# Patient Record
Sex: FEMALE | Race: BLACK OR AFRICAN AMERICAN | NOT HISPANIC OR LATINO | Employment: OTHER | URBAN - METROPOLITAN AREA
[De-identification: names, ages, dates, MRNs, and addresses within clinical notes are randomized per-mention and may not be internally consistent; named-entity substitution may affect disease eponyms.]

---

## 2021-08-17 ENCOUNTER — CLINICAL SUPPORT (OUTPATIENT)
Dept: AUDIOLOGY | Facility: CLINIC | Age: 61
End: 2021-08-17
Payer: OTHER GOVERNMENT

## 2021-08-17 ENCOUNTER — OFFICE VISIT (OUTPATIENT)
Dept: OTOLARYNGOLOGY | Facility: CLINIC | Age: 61
End: 2021-08-17
Payer: OTHER GOVERNMENT

## 2021-08-17 VITALS
HEIGHT: 62 IN | BODY MASS INDEX: 31.28 KG/M2 | WEIGHT: 170 LBS | DIASTOLIC BLOOD PRESSURE: 85 MMHG | HEART RATE: 70 BPM | SYSTOLIC BLOOD PRESSURE: 134 MMHG

## 2021-08-17 DIAGNOSIS — H90.3 SENSORINEURAL HEARING LOSS (SNHL) OF BOTH EARS: Primary | ICD-10-CM

## 2021-08-17 DIAGNOSIS — H93.13 SUBJECTIVE TINNITUS OF BOTH EARS: ICD-10-CM

## 2021-08-17 DIAGNOSIS — H90.5 HEARING LOSS, SENSORINEURAL, COMBINED TYPES: Primary | ICD-10-CM

## 2021-08-17 PROCEDURE — 99203 OFFICE O/P NEW LOW 30 MIN: CPT | Mod: S$PBB,,, | Performed by: ORTHOPAEDIC SURGERY

## 2021-08-17 PROCEDURE — 92557 COMPREHENSIVE HEARING TEST: CPT | Mod: PBBFAC | Performed by: AUDIOLOGIST

## 2021-08-17 PROCEDURE — 99999 PR PBB SHADOW E&M-EST. PATIENT-LVL III: ICD-10-PCS | Mod: PBBFAC,,, | Performed by: ORTHOPAEDIC SURGERY

## 2021-08-17 PROCEDURE — 99203 PR OFFICE/OUTPT VISIT, NEW, LEVL III, 30-44 MIN: ICD-10-PCS | Mod: S$PBB,,, | Performed by: ORTHOPAEDIC SURGERY

## 2021-08-17 PROCEDURE — 99213 OFFICE O/P EST LOW 20 MIN: CPT | Mod: PBBFAC | Performed by: ORTHOPAEDIC SURGERY

## 2021-08-17 PROCEDURE — 99999 PR PBB SHADOW E&M-EST. PATIENT-LVL III: CPT | Mod: PBBFAC,,, | Performed by: ORTHOPAEDIC SURGERY

## 2021-08-17 PROCEDURE — 92567 TYMPANOMETRY: CPT | Mod: PBBFAC | Performed by: AUDIOLOGIST

## 2021-08-17 RX ORDER — OMEPRAZOLE 40 MG/1
40 CAPSULE, DELAYED RELEASE ORAL
COMMUNITY
Start: 2021-01-09

## 2021-08-17 RX ORDER — ATORVASTATIN CALCIUM 20 MG/1
20 TABLET, FILM COATED ORAL NIGHTLY
COMMUNITY
Start: 2021-06-03

## 2021-08-17 RX ORDER — AMLODIPINE BESYLATE 10 MG/1
10 TABLET ORAL
COMMUNITY
Start: 2021-02-14 | End: 2022-12-05

## 2022-10-13 ENCOUNTER — TELEPHONE (OUTPATIENT)
Dept: DIABETES | Facility: CLINIC | Age: 62
End: 2022-10-13
Payer: OTHER GOVERNMENT

## 2022-10-13 NOTE — TELEPHONE ENCOUNTER
----- Message from Samy Calderon PA-C sent at 10/13/2022  1:53 PM CDT -----  This pt is somewhat new to [facility] - only seen ent. Can you find out what type of diabetes she has and see if we can get a copy of her records? Last pcp note and most recent A1c

## 2022-10-19 ENCOUNTER — TELEPHONE (OUTPATIENT)
Dept: DIABETES | Facility: CLINIC | Age: 62
End: 2022-10-19
Payer: OTHER GOVERNMENT

## 2022-10-19 NOTE — TELEPHONE ENCOUNTER
----- Message from Mary Harris sent at 10/19/2022  2:53 PM CDT -----  Contact: self  Type:  Sooner Apoointment Request    Caller is requesting a sooner appointment.  Caller declined first available appointment listed below.  Caller will not accept being placed on the waitlist and is requesting a message be sent to doctor.  Name of Caller: patient  When is the first available appointment? 11/30/22  Symptoms: feeling really bad  Would the patient rather a call back or a response via [facility]?  Call back  Best Call Back Number: [phone]  Additional Information:

## 2022-11-08 ENCOUNTER — TELEPHONE (OUTPATIENT)
Dept: DIABETES | Facility: CLINIC | Age: 62
End: 2022-11-08
Payer: OTHER GOVERNMENT

## 2022-11-21 ENCOUNTER — TELEPHONE (OUTPATIENT)
Dept: DIABETES | Facility: CLINIC | Age: 62
End: 2022-11-21
Payer: OTHER GOVERNMENT

## 2022-11-21 NOTE — TELEPHONE ENCOUNTER
----- Message from Hilda Zavala sent at 11/21/2022 10:19 AM CST -----  Pt would like a call back regarding a sooner appt. Call back number is [phone]. Thx JM

## 2022-11-28 ENCOUNTER — TELEPHONE (OUTPATIENT)
Dept: DIABETES | Facility: CLINIC | Age: 62
End: 2022-11-28
Payer: OTHER GOVERNMENT

## 2022-11-28 NOTE — TELEPHONE ENCOUNTER
Spoke w/ pt to request chart notes. Pt is going to contact her PCP office. Also explained to pt that we are trying to change her appt date and will contact her if able to do so.     ----- Message from Samy Calderon PA-C sent at 11/28/2022  8:07 AM CST -----  This patient needs to be switched to a Monday if she is going to come in. If we can find someone to switch with her, can she come on 12/5 instead? Respond here and let me know.     Also can she call her pcp to get them to fax us a chart note so I have some medical history for her     Thank you

## 2022-12-02 ENCOUNTER — TELEPHONE (OUTPATIENT)
Dept: DIABETES | Facility: CLINIC | Age: 62
End: 2022-12-02
Payer: OTHER GOVERNMENT

## 2022-12-02 NOTE — TELEPHONE ENCOUNTER
----- Message from Samy Calderon PA-C sent at 12/2/2022  7:51 AM CST -----  Please call pt and let her know we have not received last chart notes from pcp. Need before visit on Monday. If unable to get faxed with us can bring to her visit    Thank you

## 2022-12-02 NOTE — TELEPHONE ENCOUNTER
called pt to let her know we have not received last chart notes from pcp. Need before visit on Monday. If unable to get faxed with us can bring to her visit

## 2022-12-05 ENCOUNTER — TELEPHONE (OUTPATIENT)
Dept: OTOLARYNGOLOGY | Facility: CLINIC | Age: 62
End: 2022-12-05
Payer: OTHER GOVERNMENT

## 2022-12-05 ENCOUNTER — OFFICE VISIT (OUTPATIENT)
Dept: DIABETES | Facility: CLINIC | Age: 62
End: 2022-12-05
Payer: OTHER GOVERNMENT

## 2022-12-05 VITALS
BODY MASS INDEX: 28.2 KG/M2 | HEIGHT: 62 IN | HEART RATE: 64 BPM | WEIGHT: 153.25 LBS | DIASTOLIC BLOOD PRESSURE: 80 MMHG | SYSTOLIC BLOOD PRESSURE: 136 MMHG

## 2022-12-05 DIAGNOSIS — E78.00 PURE HYPERCHOLESTEROLEMIA: ICD-10-CM

## 2022-12-05 DIAGNOSIS — E11.65 TYPE 2 DIABETES MELLITUS WITH HYPERGLYCEMIA, WITHOUT LONG-TERM CURRENT USE OF INSULIN: Primary | ICD-10-CM

## 2022-12-05 DIAGNOSIS — R06.2 WHEEZING: ICD-10-CM

## 2022-12-05 DIAGNOSIS — I10 ESSENTIAL HYPERTENSION: ICD-10-CM

## 2022-12-05 DIAGNOSIS — J30.2 SEASONAL ALLERGIES: ICD-10-CM

## 2022-12-05 PROBLEM — F33.9 CHRONIC RECURRENT MAJOR DEPRESSIVE DISORDER: Status: ACTIVE | Noted: 2022-12-05

## 2022-12-05 PROBLEM — R94.31 ABNORMAL EKG: Status: ACTIVE | Noted: 2019-04-02

## 2022-12-05 PROBLEM — R07.9 CHEST PAIN: Status: ACTIVE | Noted: 2022-12-05

## 2022-12-05 PROBLEM — M19.90 OSTEOARTHRITIS: Status: ACTIVE | Noted: 2022-12-05

## 2022-12-05 PROBLEM — D17.21 LIPOMA OF RIGHT UPPER EXTREMITY: Status: ACTIVE | Noted: 2021-03-24

## 2022-12-05 PROBLEM — K21.9 GASTROESOPHAGEAL REFLUX DISEASE WITHOUT ESOPHAGITIS: Status: ACTIVE | Noted: 2022-12-05

## 2022-12-05 PROBLEM — E78.5 HYPERLIPIDEMIA: Status: ACTIVE | Noted: 2022-12-05

## 2022-12-05 LAB — GLUCOSE SERPL-MCNC: 156 MG/DL (ref 70–110)

## 2022-12-05 PROCEDURE — 99999 PR PBB SHADOW E&M-EST. PATIENT-LVL IV: ICD-10-PCS | Mod: PBBFAC,,, | Performed by: PHYSICIAN ASSISTANT

## 2022-12-05 PROCEDURE — 99214 OFFICE O/P EST MOD 30 MIN: CPT | Mod: PBBFAC | Performed by: PHYSICIAN ASSISTANT

## 2022-12-05 PROCEDURE — 99205 OFFICE O/P NEW HI 60 MIN: CPT | Mod: S$PBB,,, | Performed by: PHYSICIAN ASSISTANT

## 2022-12-05 PROCEDURE — 82962 GLUCOSE BLOOD TEST: CPT | Mod: PBBFAC | Performed by: PHYSICIAN ASSISTANT

## 2022-12-05 PROCEDURE — 99205 PR OFFICE/OUTPT VISIT, NEW, LEVL V, 60-74 MIN: ICD-10-PCS | Mod: S$PBB,,, | Performed by: PHYSICIAN ASSISTANT

## 2022-12-05 PROCEDURE — 99999 PR PBB SHADOW E&M-EST. PATIENT-LVL IV: CPT | Mod: PBBFAC,,, | Performed by: PHYSICIAN ASSISTANT

## 2022-12-05 RX ORDER — GABAPENTIN 300 MG/1
300 CAPSULE ORAL NIGHTLY
COMMUNITY
Start: 2022-10-12

## 2022-12-05 RX ORDER — MECLIZINE HYDROCHLORIDE 25 MG/1
25 TABLET ORAL 3 TIMES DAILY PRN
COMMUNITY
Start: 2022-08-15

## 2022-12-05 RX ORDER — METFORMIN HYDROCHLORIDE 500 MG/1
1 TABLET ORAL
COMMUNITY
Start: 2022-07-16 | End: 2022-12-05

## 2022-12-05 RX ORDER — TRAZODONE HYDROCHLORIDE 50 MG/1
50 TABLET ORAL NIGHTLY
COMMUNITY
Start: 2022-07-07

## 2022-12-05 RX ORDER — FUROSEMIDE 20 MG/1
20 TABLET ORAL DAILY PRN
COMMUNITY
Start: 2022-09-08 | End: 2022-12-05

## 2022-12-05 RX ORDER — INSULIN PUMP SYRINGE, 3 ML
EACH MISCELLANEOUS
Qty: 1 EACH | Refills: 0 | Status: SHIPPED | OUTPATIENT
Start: 2022-12-05

## 2022-12-05 RX ORDER — LANCETS
1 EACH MISCELLANEOUS 3 TIMES DAILY
Qty: 100 EACH | Refills: 11 | Status: SHIPPED | OUTPATIENT
Start: 2022-12-05

## 2022-12-05 RX ORDER — LEVOCETIRIZINE DIHYDROCHLORIDE 5 MG/1
5 TABLET, FILM COATED ORAL
COMMUNITY
Start: 2022-11-12 | End: 2022-12-05

## 2022-12-05 RX ORDER — FLUTICASONE PROPIONATE 50 MCG
SPRAY, SUSPENSION (ML) NASAL
COMMUNITY
Start: 2022-09-12

## 2022-12-05 RX ORDER — FLASH GLUCOSE SENSOR
1 KIT MISCELLANEOUS
Qty: 2 KIT | Refills: 11 | Status: SHIPPED | OUTPATIENT
Start: 2022-12-05

## 2022-12-05 RX ORDER — ALBUTEROL SULFATE 90 UG/1
2 AEROSOL, METERED RESPIRATORY (INHALATION)
Qty: 18 G | Refills: 0
Start: 2022-12-05 | End: 2022-12-05

## 2022-12-05 RX ORDER — AMLODIPINE BESYLATE 5 MG/1
5 TABLET ORAL DAILY
COMMUNITY
Start: 2022-10-12

## 2022-12-05 RX ORDER — LANCETS
1 EACH MISCELLANEOUS 3 TIMES DAILY
Qty: 100 EACH | Refills: 11 | Status: SHIPPED | OUTPATIENT
Start: 2022-12-05 | End: 2022-12-05

## 2022-12-05 RX ORDER — IBUPROFEN 800 MG/1
800 TABLET ORAL DAILY PRN
COMMUNITY

## 2022-12-05 RX ORDER — TRIAMCINOLONE ACETONIDE 1 MG/G
CREAM TOPICAL
COMMUNITY
Start: 2022-09-07

## 2022-12-05 RX ORDER — AZELASTINE 1 MG/ML
SPRAY, METERED NASAL
COMMUNITY

## 2022-12-05 RX ORDER — MONTELUKAST SODIUM 10 MG/1
10 TABLET ORAL DAILY
COMMUNITY
Start: 2022-11-20

## 2022-12-05 RX ORDER — AMOXICILLIN AND CLAVULANATE POTASSIUM 875; 125 MG/1; MG/1
TABLET, FILM COATED ORAL
COMMUNITY
End: 2022-12-05

## 2022-12-05 RX ORDER — FAMOTIDINE 40 MG/1
40 TABLET, FILM COATED ORAL DAILY
COMMUNITY
Start: 2022-11-07 | End: 2022-12-05

## 2022-12-05 RX ORDER — CYCLOBENZAPRINE HCL 10 MG
10 TABLET ORAL 3 TIMES DAILY PRN
COMMUNITY
Start: 2022-07-07

## 2022-12-05 RX ORDER — FLASH GLUCOSE SENSOR
1 KIT MISCELLANEOUS
Qty: 2 KIT | Refills: 11 | Status: SHIPPED | OUTPATIENT
Start: 2022-12-05 | End: 2022-12-05

## 2022-12-05 RX ORDER — INSULIN PUMP SYRINGE, 3 ML
EACH MISCELLANEOUS
Qty: 1 EACH | Refills: 0 | Status: SHIPPED | OUTPATIENT
Start: 2022-12-05 | End: 2022-12-05

## 2022-12-05 RX ORDER — CETIRIZINE HYDROCHLORIDE, PSEUDOEPHEDRINE HYDROCHLORIDE 5; 120 MG/1; MG/1
1 TABLET, FILM COATED, EXTENDED RELEASE ORAL
Refills: 0
Start: 2022-12-05 | End: 2022-12-15

## 2022-12-05 NOTE — Clinical Note
Follow up in 3 mo in person   Dr. Buckley staff - needs appt with Kristin and Dr. Buckley for hearing loss / transient vertigo. Transferring all care to [facility].

## 2022-12-05 NOTE — PROGRESS NOTES
PCP: Primary Doctor No    Subjective:     Chief Complaint: Diabetes Consult    HISTORY OF PRESENT ILLNESS: 62 y.o.   female presenting for diabetes consult.   Patient has had Type II diabetes since 2021.  Pertinent to decision making is the following comorbidities: HTN and HLD. Fam hx of pancreatic cancer - mother.   Patient has the following Diabetes complications: without complications  She  is to be enrolled in diabetes education classes.     Patient's most recent A1c of 6.1% was completed 3 months ago.   Patient states since Her last A1c Her blood glucose levels have been unknown since not checking regularly.   Patient monitors blood glucose  0  times per day :  Patient admits not checking and does not meter . Patient admits she does not like checking fingers.    Patient blood glucose monitoring device will not be uploaded into [employer] [facility] today.  Patient's blood glucose in clinic was 156 which was following breakfast bar.   Patient endorses the following diabetes related symptoms: None.  Patient is due today for the following diabetes-related health maintenance standards: Eye Exam, Lipid panel, A1c, Influenza Vaccine, COVID-19 Vaccine , and Mammogram .   She denies recent hospital admissions or emergency room visits.   She denies having hypoglycemia.   Patient's concerns today include glycemic control. Of note, patient reports that before being diagnosed last year with diabetes she was eating more sweets and not exercising following her senior care. Since being diagnosed, patient has been working to improve diet and exercise daily. Patient reports alternating running / walking for 2 hours daily (approx 7 miles) and takes exercise classes 2 times per week where she does floor exercises and light weights. Patient reports has cut most sweets and soft drinks out of her diet and would like more info on diet. Of note, patient would like to transfer PCP and specialty care to [facility]. Needs allergist  for chronic seasonal allergies. Need to see ENT for follow up for hearing loss and episodes of vertigo.   Patient medication regimen is as below.     CURRENT DM MEDICATIONS:   Metformin - didn't take  Diet and exercise    Patient has failed the following Diabetes medications:   N/A       Labs Reviewed.       No results found for: CPEPTIDE  No results found for: GLUTAMICACID       //   , There is no height or weight on file to calculate BMI.  Her blood sugar in clinic today is:    No components found for: POCGLUC      Review of Systems   Constitutional:  Negative for activity change, appetite change, chills and fever.   HENT:  Negative for dental problem, mouth sores, nosebleeds, sore throat and trouble swallowing.    Eyes:  Negative for pain and discharge.   Respiratory:  Negative for shortness of breath, wheezing and stridor.    Cardiovascular:  Negative for chest pain, palpitations and leg swelling.   Gastrointestinal:  Negative for abdominal pain, diarrhea, nausea and vomiting.   Endocrine: Negative for polydipsia, polyphagia and polyuria.   Genitourinary:  Negative for dysuria, frequency and urgency.   Musculoskeletal:  Negative for joint swelling and myalgias.   Skin:  Negative for rash and wound.   Neurological:  Negative for dizziness, syncope, weakness and headaches.   Psychiatric/Behavioral:  Negative for behavioral problems and dysphoric mood.        Diabetes Management Status  Statin: Taking  ACE/ARB: Not taking    Screening or Prevention Patient's value Goal Complete/Controlled?   HgA1C Testing and Control   No results found for: HGBA1C   Annually/Less than 8% No     Lipid profile Most Recent Lipid Panel Health Maintenance Topic Completion: Not Found Annually No     LDL control No results found for: LDLCALC Annually/Less than 100 mg/dl  No     Nephropathy screening No results found for: MICALBCREAT  No results found for: PROTEINUA Annually No     Blood pressure BP Readings from Last 1 Encounters:    08/17/21 134/85    Less than 140/90 Yes     Dilated retinal exam Most Recent Eye Exam Date: Not Found Annually Yes    Foot exam   Most Recent Foot Exam Date: Not Found Annually Yes       Social History     Socioeconomic History    Marital status:    Tobacco Use    Smoking status: Never    Smokeless tobacco: Never   Substance and Sexual Activity    Alcohol use: Not Currently    Drug use: Not Currently     Past Medical History:   Diagnosis Date    Hypertension        Objective:      Physical Exam  Constitutional:       General: She is not in acute distress.     Appearance: She is well-developed. She is not diaphoretic.   HENT:      Head: Normocephalic and atraumatic.      Right Ear: External ear normal.      Left Ear: External ear normal.      Nose: Nose normal.   Eyes:      General:         Right eye: No discharge.         Left eye: No discharge.      Pupils: Pupils are equal, round, and reactive to light.   Cardiovascular:      Rate and Rhythm: Normal rate and regular rhythm.      Heart sounds: Normal heart sounds.   Pulmonary:      Effort: Pulmonary effort is normal.      Breath sounds: Normal breath sounds.   Abdominal:      Palpations: Abdomen is soft.   Musculoskeletal:         General: Normal range of motion.      Cervical back: Normal range of motion and neck supple.   Skin:     General: Skin is warm and dry.      Capillary Refill: Capillary refill takes less than 2 seconds.   Neurological:      Mental Status: She is alert and oriented to person, place, and time.      Motor: No abnormal muscle tone.      Coordination: Coordination normal.   Psychiatric:         Behavior: Behavior normal.         Thought Content: Thought content normal.         Judgment: Judgment normal.         Assessment / Plan:     Type 2 diabetes mellitus with hyperglycemia, without long-term current use of insulin  -     POCT Glucose, Hand-Held Device  -     Ambulatory referral/consult to Internal Medicine; Future; Expected date:  12/12/2022  -     Hemoglobin A1C; Standing  -     Lipid Panel; Future; Expected date: 12/05/2022  -     Discontinue: flash glucose sensor (FREESTYLE DIANA 14 DAY SENSOR) Kit; 1 each by Misc.(Non-Drug; Combo Route) route every 14 (fourteen) days.  Dispense: 2 kit; Refill: 11  -     Discontinue: blood-glucose meter kit; Test finger stick blood sugar once daily.  Dispense: 1 each; Refill: 0  -     Discontinue: blood sugar diagnostic Strp; 1 each by Misc.(Non-Drug; Combo Route) route 3 (three) times daily.  Dispense: 100 each; Refill: 11  -     Discontinue: lancets (ONETOUCH ULTRASOFT LANCETS) Misc; 1 each by Misc.(Non-Drug; Combo Route) route 3 (three) times daily.  Dispense: 100 each; Refill: 11  -     blood sugar diagnostic Strp; 1 each by Misc.(Non-Drug; Combo Route) route 3 (three) times daily.  Dispense: 100 each; Refill: 11  -     blood-glucose meter kit; Test finger stick blood sugar once daily.  Dispense: 1 each; Refill: 0  -     flash glucose sensor (FREESTYLE DIANA 14 DAY SENSOR) Kit; 1 each by Misc.(Non-Drug; Combo Route) route every 14 (fourteen) days.  Dispense: 2 kit; Refill: 11  -     lancets (ONETOUCH ULTRASOFT LANCETS) Misc; 1 each by Misc.(Non-Drug; Combo Route) route 3 (three) times daily.  Dispense: 100 each; Refill: 11  -     Ambulatory referral/consult to Diabetes Education; Future; Expected date: 12/12/2022    Wheezing  -     Discontinue: albuterol (VENTOLIN HFA) 90 mcg/actuation inhaler; Inhale 2 puffs into the lungs every 4 to 6 hours as needed for Wheezing. Rescue (Patient not taking: Reported on 12/5/2022)  Dispense: 18 g; Refill: 0  -     Ambulatory referral/consult to Allergy; Future; Expected date: 12/12/2022    Essential hypertension  -     Ambulatory referral/consult to Internal Medicine; Future; Expected date: 12/12/2022    Pure hypercholesterolemia  -     Ambulatory referral/consult to Internal Medicine; Future; Expected date: 12/12/2022    Seasonal allergies  -     Ambulatory  referral/consult to Allergy; Future; Expected date: 12/12/2022  -     cetirizine-pseudoephedrine 5-120 mg Tb12; Take 1 tablet by mouth as needed.; Refill: 0    Additional Plan Details:    - POCT Glucose  - Encouraged continuation of lifestyle changes including regular exercise and limiting carbohydrates to 30-45 grams per meal threes times daily and 15 grams per snack with a limit of two daily.   - Encouraged continued monitoring of blood glucose with an increase to 1 times 3 times per week at Fasting. Insurance preferred Meter and supplies Rx today. Will call back for meter training and Latasha training. Latasha Rx OTC.   - Current DM Medication Regimen: Continue diet and exercise.   - Health Maintenance standards addressed today: Eye Exam - will be completed outside of [facility] and patient will schedule, Lipid panel to be scheduled today, A1c to be scheduled, Flu Shot - Patient completed outside of [facility]; will coordinate records retrieval , and Mammogram - [facility]  - Referral to CDE for establishment of care for comprehensive Diabetes Education  - Added medications taking outside of [facility]  - Referral to internal medicine - est care with PCP   - Referral to Allergy - chronic seasonal allergies  - Follow up with ENT for hearing loss and vertigo episodes   - Nursing Visit: Patient is below goal range for nursing visit for age group and will not need nursing visit at this time .   - Follow up with me in 3 months with A1c prior.       Blakeney McKnight, PA-C [facility]    A total of 60 minutes was spent in face to face time, of which over 50 % was spent in counseling patient on disease process, complications, treatment, and side effects of medications.

## 2022-12-23 ENCOUNTER — TELEPHONE (OUTPATIENT)
Dept: DIABETES | Facility: CLINIC | Age: 62
End: 2022-12-23
Payer: OTHER GOVERNMENT

## 2022-12-23 NOTE — TELEPHONE ENCOUNTER
Attempted to R/S 1/10 appt with Radha since no longer seeing pts at [facility]. No answer and VM not set up.

## 2023-01-03 ENCOUNTER — LAB VISIT (OUTPATIENT)
Dept: LAB | Facility: HOSPITAL | Age: 63
End: 2023-01-03
Attending: PHYSICIAN ASSISTANT
Payer: MEDICARE

## 2023-01-03 DIAGNOSIS — E11.65 TYPE 2 DIABETES MELLITUS WITH HYPERGLYCEMIA, WITHOUT LONG-TERM CURRENT USE OF INSULIN: ICD-10-CM

## 2023-01-03 PROCEDURE — 36415 COLL VENOUS BLD VENIPUNCTURE: CPT | Mod: PO | Performed by: PHYSICIAN ASSISTANT

## 2023-01-03 PROCEDURE — 80061 LIPID PANEL: CPT | Performed by: PHYSICIAN ASSISTANT

## 2023-01-03 PROCEDURE — 83036 HEMOGLOBIN GLYCOSYLATED A1C: CPT | Performed by: PHYSICIAN ASSISTANT

## 2023-01-04 LAB
CHOLEST SERPL-MCNC: 200 MG/DL (ref 120–199)
CHOLEST/HDLC SERPL: 4 {RATIO} (ref 2–5)
ESTIMATED AVG GLUCOSE: 128 MG/DL (ref 68–131)
HBA1C MFR BLD: 6.1 % (ref 4–5.6)
HDLC SERPL-MCNC: 50 MG/DL (ref 40–75)
HDLC SERPL: 25 % (ref 20–50)
LDLC SERPL CALC-MCNC: 130.8 MG/DL (ref 63–159)
NONHDLC SERPL-MCNC: 150 MG/DL
TRIGL SERPL-MCNC: 96 MG/DL (ref 30–150)

## 2023-01-10 ENCOUNTER — TELEPHONE (OUTPATIENT)
Dept: DIABETES | Facility: CLINIC | Age: 63
End: 2023-01-10
Payer: MEDICARE

## 2023-01-10 NOTE — TELEPHONE ENCOUNTER
Per pt request, r/s to Friday, Jan 20 at 9 am.    ----- Message from Nancy Haro RD, CDE sent at 1/10/2023  3:02 PM CST -----  Contact: self/[phone]    ----- Message -----  From: Marely Brian  Sent: 1/10/2023   1:22 PM CST  To: , #    Pt is calling in regards to appointment on tomorrow Wednesday 1/11/23, she states she would like to reschedule. Please give her a call back at [phone]. Thank you s/g

## 2023-01-20 ENCOUNTER — CLINICAL SUPPORT (OUTPATIENT)
Dept: DIABETES | Facility: CLINIC | Age: 63
End: 2023-01-20
Payer: MEDICARE

## 2023-01-20 VITALS — BODY MASS INDEX: 27.62 KG/M2 | WEIGHT: 151 LBS

## 2023-01-20 DIAGNOSIS — E11.65 TYPE 2 DIABETES MELLITUS WITH HYPERGLYCEMIA, WITHOUT LONG-TERM CURRENT USE OF INSULIN: ICD-10-CM

## 2023-01-20 PROCEDURE — 99213 OFFICE O/P EST LOW 20 MIN: CPT | Mod: PBBFAC

## 2023-01-20 PROCEDURE — 99999 PR PBB SHADOW E&M-EST. PATIENT-LVL III: CPT | Mod: PBBFAC,,,

## 2023-01-20 PROCEDURE — G0108 DIAB MANAGE TRN  PER INDIV: HCPCS | Mod: PBBFAC

## 2023-01-20 PROCEDURE — 99999 PR PBB SHADOW E&M-EST. PATIENT-LVL III: ICD-10-PCS | Mod: PBBFAC,,,

## 2023-01-20 NOTE — PROGRESS NOTES
Diabetes Care Specialist Progress Note  Author: Alejandra Girard RN  Date: 1/20/2023    Program Intake  Reason for Diabetes Program Visit:: Initial Diabetes Assessment  Current diabetes risk level:: low  In the last 12 months, have you:: none  Permission to speak with others about care:: no    Lab Results   Component Value Date    HGBA1C 6.1 (H) 01/03/2023     DM INTRO  Weight: 68.5 kg (151 lb 0.2 oz)       Body mass index is 27.62 kg/m².    CURRENT MEDS  None      Clinical    Patient Health Rating  Compared to other people your age, how would you rate your health?: Good    Problem Review  Reviewed Problem List with Patient: yes  Active comorbidities affecting diabetes self-care.: yes  Comorbidities: Cardiovascular Disease, Hypertension, Gastrointestinal Disorder  Reviewed health maintenance: yes    Clinical Assessment  Current Diabetes Treatment: Diet, Exercise  Have you ever experienced hypoglycemia (low blood sugar)?:  (Not checking.)  Have you ever experienced hyperglycemia (high blood sugar)?:  (Not checking.)    Medication Information  How do you obtain your medications?: Patient drives    Labs  Do you have regular lab work to monitor your medications?: Yes  Type of Regular Lab Work: A1c, Cholesterol, Microalbumin, CBC, BMP  Where do you get your labs drawn?: [facility]  Lab Compliance Barriers: No    Nutritional Status  Diet: Regular  Meal Plan 24 Hour Recall: Breakfast, Lunch, Dinner, Snack (On 5 day fast)  Meal Plan 24 Hour Recall - Breakfast: None.  Meal Plan 24 Hour Recall - Lunch: Salad: Lettuce, tomatoes, cucumber, grapes, apples, cheese, Ranch; Water  Meal Plan 24 Hour Recall - Dinner: Baked chicken wings; Water  Meal Plan 24 Hour Recall - Snack: None. (Usually: fruit)  Change in appetite?: No  Dentation:: Wears Dentures  Recent Changes in Weight: No Recent Weight Change  Current nutritional status an area of need that is impacting patient's ability to self-manage diabetes?: No    Additional Social  History    Support  Does anyone support you with your diabetes care?: yes  Who supports you?: spouse, self  Who takes you to your medical appointments?: spouse  Does the current support meet the patient's needs?: Yes  Is Support an area impacting ability to self-manage diabetes?: No    Access to Mass Media & Technology  Does the patient have access to any of the following devices or technologies?: Smart phone  Media or technology needs impacting ability to self-manage diabetes?: No    Cognitive/Behavioral Health  Alert and Oriented: Yes  Difficulty Thinking: No  Requires Prompting: No  Requires assistance for routine expression?: No  Cognitive or behavioral barriers impacting ability to self-manage diabetes?: No    Culture/Latter day  Culture or Buddhism beliefs that may impact ability to access healthcare: No    Communication  Language preference: English  Hearing Problems: No  Vision Problems: Yes  Vision problem type:: Decreased Vision  Vision Assistance: Glasses  Communication needs impacting ability to self-manage diabetes?: No    Health Literacy  Preferred Learning Method: Face to Face, Reading Materials  How often do you need to have someone help you read instructions, pamphlets, or written material from your doctor or pharmacy?: Never  Health literacy needs impacting ability to self-manage diabetes?: No      Diabetes Self-Management Skills Assessment    Diabetes Disease Process/Treatment Options  Patient/caregiver able to state what happens when someone has diabetes.: no  Patient/caregiver knows what type of diabetes they have.: yes  Diabetes Type : Type II  Patient/caregiver able to identify at least three signs and symptoms of diabetes.: no  Patient able to identify at least three risk factors for diabetes.: no  Diabetes Disease Process/Treatment Options: Skills Assessment Completed: Yes  Assessment indicates:: Knowledge deficit  Area of need?: Yes    Nutrition/Healthy Eating  Challenges to healthy eating::  snacking between meals and at night  Method of carbohydrate measurement:: carb counting/reading labels  Patient can identify foods that impact blood sugar.: yes  Patient-identified foods:: starches (bread, pasta, rice, cereal), soda  Nutrition/Healthy Eating Skills Assessment Completed:: Yes  Assessment indicates:: Knowledge deficit, Instruction Needed  Area of need?: Yes    Physical Activity/Exercise  Patient's daily activity level:: constantly moving  Patient formally exercises outside of work.: yes  Exercise Type: walking  Intensity: Moderate  Frequency: four or more times a week  Duration: 45 min  Patient can identify forms of physical activity.: yes  Stated forms of physical activity:: any movement performed by muscles that uses energy, housework, moving to burn calories  Patient can identify reasons why exercise/physical activity is important in diabetes management.: no  Physical Activity/Exercise Skills Assessment Completed: : Yes  Assessment indicates:: Knowledge deficit  Area of need?: Yes    Medications  Medication Skills Assessment Completed:: No  Deffered due to:: Other (comment) (Patient not on DM meds)  Area of need?:  (Deferred.)    Home Blood Glucose Monitoring  Patient states that blood sugar is checked at home daily.: no  Reasons for not monitoring:: new diabetes diagnosis  Home Blood Glucose Monitoring Skills Assessment Completed: : Yes  Assessment indicates:: Knowledge deficit, Instruction Needed  Area of need?: Yes    Acute Complications  Patient is able to identify types of acute complications: No  Acute Complications Skills Assessment Completed: : Yes  Assessment indicates:: Knowledge deficit, Instruction Needed  Area of need?: Yes    Chronic Complications  Patient can identify major chronic complications of diabetes.: no  Patient can identify ways to prevent or delay diabetes complications.: no  Patient is aware that having diabetes increases risk of heart disease?: No  Patient is aware that  heart disease is the leading cause of death and disability in people with diabetes?: No  Patient able to state risk factors for heart disease?: No  Patient is taking statin?: Yes  Chronic Complications Skills Assessment Completed: : Yes  Assessment indicates:: Knowledge deficit  Area of need?: Yes    Psychosocial/Coping  Patient can identify ways of coping with chronic disease.: yes  Patient-stated ways of coping with chronic disease:: support from loved ones, other (see comments) (Educating herself on diabetes)  Psychosocial/Coping Skills Assessment Completed: : Yes  Assessment indicates:: Adequate understanding  Area of need?: No    Assessment Summary and Plan    Based on today's diabetes care assessment, the following areas of need were identified:      Social 1/20/2023   Support No   Access to Mass Media/Tech No   Cognitive/Behavioral Health No   Culture/Oriental orthodox No   Communication No   Health Literacy No        Clinical 1/20/2023   Lab Compliance No   Nutritional Status No        Diabetes Self-Management Skills 1/20/2023   Diabetes Disease Process/Treatment Options Yes-Reviewed pathophysiology of type 2 diabetes, complications related to the disease, importance of annual dilated eye exam, and daily foot exam.  Since diagnosis of DM, patient has adjusted eating habits by eliminating sugary beverages and snacks. She has increased her salad intake. She has increased her physical activity. Pt is motivated to adhere to the lifestyle changes.      Nutrition/Healthy Eating Yes-See care plan.     Physical Activity/Exercise Yes-Discussed physical activity as it relates to insulin resistance.  Pt walks 4-5 miles 5 days/week.      Medication Deferred.     Home Blood Glucose Monitoring Yes-Discussed checking blood sugars at least 1x/day. Patient is hesitant to do so because of finger pain. Has not picked up meter from the pharmacy yet. SACHI Pablo ordered a CGM; instructed patient to call when she gets it to be  trained.  Provided her with logs if she does start checking blood sugars. Pt's goal is to achieve A1C <5.7%, made fasting goal <110 mg/dL.      Acute Complications Yes-Reviewed blood glucose goals, prevention, detection, signs and symptoms, and treatment of hypoglycemia and hyperglycemia, and when to contact the clinic.     Chronic Complications Yes-Discussed importance of A1c less than 7 to reduce risk of micro and macro complications, including nephropathy, neuropathy, retinopathy, heart attack and stroke. Reviewed the importance of controlled Blood Pressure and Cholesterol Lab Values in preventing disease.   Health maintenance reviewed     Psychosocial/Coping No            Today's interventions were provided through individual discussion, instruction, and written materials were provided.      Patient verbalized understanding of instruction and written materials.  Pt was able to return back demonstration of instructions today. Patient understood key points, needs reinforcement and further instruction.     Diabetes Self-Management Care Plan:    Today's Diabetes Self-Management Care Plan was developed with Nuria's input. Nuria has agreed to work toward the following goal(s) to improve his/her overall diabetes control.      Care Plan: Diabetes Management   Updates made since 12/21/2022 12:00 AM        Problem: Healthy Eating         Goal: Eat 2-3 meals daily with 30-45g of carbohydrates per meal and 15g per snack.    Start Date: 1/20/2023   Expected End Date: 1/20/2024   Priority: High   Barriers: No Barriers Identified   Note:    Educated on the role of carbohydrates and why they are important in the body.    Educated on the importance of pairing carbs with protein and/or healthy fat to help stabilize blood sugars and to help feel stearns for longer.     Educated on the use of measuring cups as serving spoons to help with accurate carb counting.     Provided her with 25 healthy snack ideas from  www.diabetesfoodhub.org and healthy meal planning from [address].     Patient eats a lot of fruit; encouraged practicing moderation and pairing with healthy fat and/or protein.        Task: Reviewed the sources and role of Carbohydrate, Protein, and Fat and how each nutrient impacts blood sugar. Completed 1/20/2023        Task: Provided visual examples using dry measuring cups, food models, and other familiar objects such as computer mouse, deck or cards, tennis ball etc. to help with visualization of portions. Completed 1/20/2023        Task: Explained how to count carbohydrates using the food label and the use of dry measuring cups for accurate carb counting. Completed 1/20/2023        Task: Review the importance of balancing carbohydrates with each meal using portion control techniques to count servings of carbohydrate and label reading to identify serving size and amount of total carbs per serving. Completed 1/20/2023        Task: Provided Sample plate method and reviewed the use of the plate to estimate amounts of carbohydrate per meal. Completed 1/20/2023          Follow Up Plan     Follow up in about 3 months (around 4/20/2023).    Today's care plan and follow up schedule was discussed with patient.  Nuria verbalized understanding of the care plan, goals, and agrees to follow up plan.        The patient was encouraged to communicate with his/her health care provider/physician and care team regarding his/her condition(s) and treatment.  I provided the patient with my contact information today and encouraged to contact me via phone or [facility] Portal as needed.     Length of Visit   Total Time: 60 Minutes

## 2023-03-07 ENCOUNTER — TELEPHONE (OUTPATIENT)
Dept: DIABETES | Facility: CLINIC | Age: 63
End: 2023-03-07
Payer: MEDICARE

## 2023-03-07 NOTE — TELEPHONE ENCOUNTER
Returned patients call to let her know that at this time the diabetes department is currently virtual and unable to do any in person visits   Patient voiced understanding

## 2023-03-07 NOTE — TELEPHONE ENCOUNTER
----- Message from Marilyn Perez sent at 3/7/2023  3:48 PM CST -----  Contact: Nuria  Patient is calling to speak with the nurse in regards to appt. Reports needing in office appt scheduled for f/u. Please give patient a callback at [phone].

## 2023-04-26 ENCOUNTER — TELEPHONE (OUTPATIENT)
Dept: DIABETES | Facility: CLINIC | Age: 63
End: 2023-04-26
Payer: MEDICARE

## 2023-04-26 NOTE — TELEPHONE ENCOUNTER
----- Message from Jess Espana sent at 4/26/2023  8:45 AM CDT -----  Contact: self  Pt is asking for an return call in reference to scheduling an apt , please call back at [phone] Thx CJ

## 2023-05-03 ENCOUNTER — TELEPHONE (OUTPATIENT)
Dept: DIABETES | Facility: CLINIC | Age: 63
End: 2023-05-03
Payer: MEDICARE

## 2023-05-03 NOTE — TELEPHONE ENCOUNTER
Spoke with patient, Samy had a cancellation for Monday 5/8 @ 10AM, pt declined because she would be here for an appointment 5/11, I told the patient that the appointment on 5/8 would be a virtual appointment, patient declined that appointment stating that she wants a face to face appointment, I informed her that there are not any face to face appointments at this time due to staffing issues, only virtual. Pt declined appointment due to it being virtual.

## 2023-05-03 NOTE — TELEPHONE ENCOUNTER
----- Message from Evelin Rivera sent at 5/3/2023 12:51 PM CDT -----  Contact: Nuria  Type:  Sooner Apoointment Request    Caller is requesting a sooner appointment. Caller will not accept being placed on the waitlist and is requesting a message be sent to doctor.  Name of Caller:Nuria  When is the first available appointment?unknown  Symptoms:diabetics check/review test results  Would the patient rather a call back or a response via [facility]? call  Best Call Back Number:[phone]  Additional Information: Patient reports sending a request previously and not yet receiving a response. Please give patient a call back to assist.      Thank you,  GH

## 2024-10-30 ENCOUNTER — TELEPHONE (OUTPATIENT)
Dept: DIABETES | Facility: CLINIC | Age: 64
End: 2024-10-30
Payer: MEDICARE

## 2024-10-30 ENCOUNTER — TELEPHONE (OUTPATIENT)
Dept: INTERNAL MEDICINE | Facility: CLINIC | Age: 64
End: 2024-10-30
Payer: MEDICARE

## 2024-11-13 ENCOUNTER — TELEPHONE (OUTPATIENT)
Dept: DIABETES | Facility: CLINIC | Age: 64
End: 2024-11-13
Payer: OTHER GOVERNMENT

## 2024-11-13 DIAGNOSIS — E03.9 HYPOTHYROIDISM, UNSPECIFIED TYPE: ICD-10-CM

## 2024-11-13 DIAGNOSIS — E11.65 TYPE 2 DIABETES MELLITUS WITH HYPERGLYCEMIA, WITHOUT LONG-TERM CURRENT USE OF INSULIN: Primary | ICD-10-CM

## 2024-11-13 NOTE — TELEPHONE ENCOUNTER
Pt wanted to know if we could send a message to  for them to call the Pt and schedule an Appt. I let Pt know I would send the message over to the doctors office but to stay by her phone because they haven't been able to reach her and her VM Box is full so we're unable to LVM ; Pt verbalized understanding.

## 2024-11-13 NOTE — TELEPHONE ENCOUNTER
Called Pt to confirm upcoming Appt that's on 11/14/2024 at 11:00am with Samy Calderon PA-C; Pt confirmed Appt and asked if she needed to do any labs. I informed the Pt she would need to complete a Lipid and A1c, which would be fasting, before her Appt, Per Samy Calderon PA-C.

## 2024-11-14 ENCOUNTER — LAB VISIT (OUTPATIENT)
Dept: LAB | Facility: HOSPITAL | Age: 64
End: 2024-11-14
Attending: PHYSICIAN ASSISTANT
Payer: OTHER GOVERNMENT

## 2024-11-14 ENCOUNTER — OFFICE VISIT (OUTPATIENT)
Dept: DIABETES | Facility: CLINIC | Age: 64
End: 2024-11-14
Payer: OTHER GOVERNMENT

## 2024-11-14 VITALS
SYSTOLIC BLOOD PRESSURE: 123 MMHG | WEIGHT: 162.69 LBS | DIASTOLIC BLOOD PRESSURE: 84 MMHG | BODY MASS INDEX: 29.76 KG/M2 | HEART RATE: 74 BPM

## 2024-11-14 DIAGNOSIS — E11.65 TYPE 2 DIABETES MELLITUS WITH HYPERGLYCEMIA, WITHOUT LONG-TERM CURRENT USE OF INSULIN: ICD-10-CM

## 2024-11-14 DIAGNOSIS — E78.00 PURE HYPERCHOLESTEROLEMIA: ICD-10-CM

## 2024-11-14 DIAGNOSIS — R41.3 MEMORY LOSS: ICD-10-CM

## 2024-11-14 DIAGNOSIS — E03.9 HYPOTHYROIDISM (ACQUIRED): ICD-10-CM

## 2024-11-14 DIAGNOSIS — E03.9 HYPOTHYROIDISM, UNSPECIFIED TYPE: ICD-10-CM

## 2024-11-14 DIAGNOSIS — D22.9 ATYPICAL MOLE: ICD-10-CM

## 2024-11-14 DIAGNOSIS — E11.65 TYPE 2 DIABETES MELLITUS WITH HYPERGLYCEMIA, WITHOUT LONG-TERM CURRENT USE OF INSULIN: Primary | ICD-10-CM

## 2024-11-14 DIAGNOSIS — I10 ESSENTIAL HYPERTENSION: ICD-10-CM

## 2024-11-14 LAB
ALBUMIN SERPL BCP-MCNC: 4.2 G/DL (ref 3.5–5.2)
ALP SERPL-CCNC: 87 U/L (ref 40–150)
ALT SERPL W/O P-5'-P-CCNC: 16 U/L (ref 10–44)
ANION GAP SERPL CALC-SCNC: 10 MMOL/L (ref 8–16)
AST SERPL-CCNC: 21 U/L (ref 10–40)
BASOPHILS # BLD AUTO: 0.08 K/UL (ref 0–0.2)
BASOPHILS NFR BLD: 1.5 % (ref 0–1.9)
BILIRUB SERPL-MCNC: 0.9 MG/DL (ref 0.1–1)
BUN SERPL-MCNC: 11 MG/DL (ref 8–23)
C PEPTIDE SERPL-MCNC: 2.42 NG/ML (ref 0.78–5.19)
CALCIUM SERPL-MCNC: 10.1 MG/DL (ref 8.7–10.5)
CHLORIDE SERPL-SCNC: 107 MMOL/L (ref 95–110)
CHOLEST SERPL-MCNC: 206 MG/DL (ref 120–199)
CHOLEST/HDLC SERPL: 4.5 {RATIO} (ref 2–5)
CO2 SERPL-SCNC: 24 MMOL/L (ref 23–29)
CREAT SERPL-MCNC: 1 MG/DL (ref 0.5–1.4)
DIFFERENTIAL METHOD BLD: ABNORMAL
EOSINOPHIL # BLD AUTO: 0.3 K/UL (ref 0–0.5)
EOSINOPHIL NFR BLD: 6.2 % (ref 0–8)
ERYTHROCYTE [DISTWIDTH] IN BLOOD BY AUTOMATED COUNT: 14.1 % (ref 11.5–14.5)
EST. GFR  (NO RACE VARIABLE): >60 ML/MIN/1.73 M^2
ESTIMATED AVG GLUCOSE: 131 MG/DL (ref 68–131)
GLUCOSE SERPL-MCNC: 105 MG/DL (ref 70–110)
GLUCOSE SERPL-MCNC: 91 MG/DL (ref 70–110)
HBA1C MFR BLD: 6.2 % (ref 4–5.6)
HCT VFR BLD AUTO: 45.2 % (ref 37–48.5)
HDLC SERPL-MCNC: 46 MG/DL (ref 40–75)
HDLC SERPL: 22.3 % (ref 20–50)
HGB BLD-MCNC: 14.6 G/DL (ref 12–16)
IMM GRANULOCYTES # BLD AUTO: 0 K/UL (ref 0–0.04)
IMM GRANULOCYTES NFR BLD AUTO: 0 % (ref 0–0.5)
LDLC SERPL CALC-MCNC: 128.2 MG/DL (ref 63–159)
LYMPHOCYTES # BLD AUTO: 2.6 K/UL (ref 1–4.8)
LYMPHOCYTES NFR BLD: 46.4 % (ref 18–48)
MCH RBC QN AUTO: 26.4 PG (ref 27–31)
MCHC RBC AUTO-ENTMCNC: 32.3 G/DL (ref 32–36)
MCV RBC AUTO: 82 FL (ref 82–98)
MONOCYTES # BLD AUTO: 0.4 K/UL (ref 0.3–1)
MONOCYTES NFR BLD: 8 % (ref 4–15)
NEUTROPHILS # BLD AUTO: 2.1 K/UL (ref 1.8–7.7)
NEUTROPHILS NFR BLD: 37.9 % (ref 38–73)
NONHDLC SERPL-MCNC: 160 MG/DL
NRBC BLD-RTO: 0 /100 WBC
PLATELET # BLD AUTO: 352 K/UL (ref 150–450)
PMV BLD AUTO: 10.5 FL (ref 9.2–12.9)
POTASSIUM SERPL-SCNC: 3.6 MMOL/L (ref 3.5–5.1)
PROT SERPL-MCNC: 7.8 G/DL (ref 6–8.4)
RBC # BLD AUTO: 5.53 M/UL (ref 4–5.4)
SODIUM SERPL-SCNC: 141 MMOL/L (ref 136–145)
T4 FREE SERPL-MCNC: 0.98 NG/DL (ref 0.71–1.51)
TRIGL SERPL-MCNC: 159 MG/DL (ref 30–150)
TSH SERPL DL<=0.005 MIU/L-ACNC: 1.47 UIU/ML (ref 0.4–4)
WBC # BLD AUTO: 5.49 K/UL (ref 3.9–12.7)

## 2024-11-14 PROCEDURE — 99999PBSHW POCT GLUCOSE, HAND-HELD DEVICE: Mod: PBBFAC,,,

## 2024-11-14 PROCEDURE — 80053 COMPREHEN METABOLIC PANEL: CPT | Performed by: PHYSICIAN ASSISTANT

## 2024-11-14 PROCEDURE — 80061 LIPID PANEL: CPT | Performed by: PHYSICIAN ASSISTANT

## 2024-11-14 PROCEDURE — 86341 ISLET CELL ANTIBODY: CPT | Performed by: PHYSICIAN ASSISTANT

## 2024-11-14 PROCEDURE — 36415 COLL VENOUS BLD VENIPUNCTURE: CPT | Performed by: PHYSICIAN ASSISTANT

## 2024-11-14 PROCEDURE — 82962 GLUCOSE BLOOD TEST: CPT | Mod: PBBFAC | Performed by: PHYSICIAN ASSISTANT

## 2024-11-14 PROCEDURE — 84681 ASSAY OF C-PEPTIDE: CPT | Performed by: PHYSICIAN ASSISTANT

## 2024-11-14 PROCEDURE — 99214 OFFICE O/P EST MOD 30 MIN: CPT | Mod: PBBFAC | Performed by: PHYSICIAN ASSISTANT

## 2024-11-14 PROCEDURE — 83036 HEMOGLOBIN GLYCOSYLATED A1C: CPT | Performed by: PHYSICIAN ASSISTANT

## 2024-11-14 PROCEDURE — 84439 ASSAY OF FREE THYROXINE: CPT | Performed by: PHYSICIAN ASSISTANT

## 2024-11-14 PROCEDURE — 84443 ASSAY THYROID STIM HORMONE: CPT | Performed by: PHYSICIAN ASSISTANT

## 2024-11-14 PROCEDURE — G2211 COMPLEX E/M VISIT ADD ON: HCPCS | Mod: S$PBB,,, | Performed by: PHYSICIAN ASSISTANT

## 2024-11-14 PROCEDURE — 85025 COMPLETE CBC W/AUTO DIFF WBC: CPT | Performed by: PHYSICIAN ASSISTANT

## 2024-11-14 PROCEDURE — 99214 OFFICE O/P EST MOD 30 MIN: CPT | Mod: S$PBB,,, | Performed by: PHYSICIAN ASSISTANT

## 2024-11-14 PROCEDURE — 99999 PR PBB SHADOW E&M-EST. PATIENT-LVL IV: CPT | Mod: PBBFAC,,, | Performed by: PHYSICIAN ASSISTANT

## 2024-11-14 RX ORDER — INSULIN PUMP SYRINGE, 3 ML
EACH MISCELLANEOUS
Qty: 1 EACH | Refills: 0 | Status: SHIPPED | OUTPATIENT
Start: 2024-11-14 | End: 2025-11-14

## 2024-11-14 RX ORDER — LANCETS
EACH MISCELLANEOUS
Qty: 200 EACH | Refills: 3 | Status: SHIPPED | OUTPATIENT
Start: 2024-11-14

## 2024-11-14 NOTE — PROGRESS NOTES
"PCP: No, Primary Doctor    Subjective:     Chief Complaint: Diabetes Consult    HISTORY OF PRESENT ILLNESS: 64 y.o.   female presenting for diabetes consult.   Patient has had Type II diabetes since 2021.  Pertinent to decision making is the following comorbidities: HTN and HLD. Fam hx of pancreatic cancer - mother.   Patient has the following Diabetes complications: without complications  She  is to be enrolled in diabetes education classes.     Patient's most recent A1c of 6.1% was completed almost 2 years ago. Pt reports A1c in high 6% range 6 mo ago at PCP office.   Patient states since Her last A1c Her blood glucose levels have been unknown since not checking regularly.   Patient monitors blood glucose  0  times per day :  Patient admits not checking and does not meter . Patient admits she does not like checking fingers.    Patient blood glucose monitoring device will not be uploaded into Media Section today.  Patient's blood glucose in clinic was 156 which was following breakfast bar.   Patient endorses the following diabetes related symptoms: None.  Patient is due today for the following diabetes-related health maintenance standards: Eye Exam, Lipid panel, A1c, Influenza Vaccine, COVID-19 Vaccine , and Mammogram .   She denies recent hospital admissions or emergency room visits.   She denies having hypoglycemia.   Patient's concerns today include glycemic control. Of note, patient states she has been slacking on exercise lately - does exercise classes and walking. Would like to transition to PCP at [facility].    Patient medication regimen is as below.     CURRENT DM MEDICATIONS:   Diet and exercise    Patient has failed the following Diabetes medications:   N/A       Labs Reviewed.       No results found for: "CPEPTIDE"  No results found for: "GLUTAMICACID"       //  Weight: 73.8 kg (162 lb 11.2 oz), Body mass index is 29.76 kg/m².  Her blood sugar in clinic today is:    No components found for: " ""POCGLUC"      Review of Systems   Constitutional:  Negative for activity change, appetite change, chills and fever.   HENT:  Negative for dental problem, mouth sores, nosebleeds, sore throat and trouble swallowing.    Eyes:  Negative for pain and discharge.   Respiratory:  Negative for shortness of breath, wheezing and stridor.    Cardiovascular:  Negative for chest pain, palpitations and leg swelling.   Gastrointestinal:  Negative for abdominal pain, diarrhea, nausea and vomiting.   Endocrine: Negative for polydipsia, polyphagia and polyuria.   Genitourinary:  Negative for dysuria, frequency and urgency.   Musculoskeletal:  Negative for joint swelling and myalgias.   Skin:  Negative for rash and wound.   Neurological:  Negative for dizziness, syncope, weakness and headaches.   Psychiatric/Behavioral:  Negative for behavioral problems and dysphoric mood.          Diabetes Management Status  Statin: Taking  ACE/ARB: Not taking    Screening or Prevention Patient's value Goal Complete/Controlled?   HgA1C Testing and Control   Lab Results   Component Value Date    HGBA1C 6.1 (H) 01/03/2023      Annually/Less than 8% No     Lipid profile : 01/03/2023 Annually No     LDL control Lab Results   Component Value Date    LDLCALC 130.8 01/03/2023    Annually/Less than 100 mg/dl  No     Nephropathy screening No results found for: "MICALBCREAT"  No results found for: "PROTEINUA" Annually No     Blood pressure BP Readings from Last 1 Encounters:   11/14/24 123/84    Less than 140/90 Yes     Dilated retinal exam Most Recent Eye Exam Date: Not Found Annually Yes    Foot exam   Most Recent Foot Exam Date: Not Found Annually Yes       Social History     Socioeconomic History    Marital status:    Tobacco Use    Smoking status: Never    Smokeless tobacco: Never   Substance and Sexual Activity    Alcohol use: Not Currently    Drug use: Not Currently     Past Medical History:   Diagnosis Date    Hypertension        Objective:    "   Physical Exam  Constitutional:       General: She is not in acute distress.     Appearance: She is well-developed. She is not diaphoretic.   HENT:      Head: Normocephalic and atraumatic.      Right Ear: External ear normal.      Left Ear: External ear normal.      Nose: Nose normal.   Eyes:      General:         Right eye: No discharge.         Left eye: No discharge.      Pupils: Pupils are equal, round, and reactive to light.   Cardiovascular:      Rate and Rhythm: Normal rate and regular rhythm.      Heart sounds: Normal heart sounds.   Pulmonary:      Effort: Pulmonary effort is normal.      Breath sounds: Normal breath sounds.   Abdominal:      Palpations: Abdomen is soft.   Musculoskeletal:         General: Normal range of motion.      Cervical back: Normal range of motion and neck supple.   Skin:     General: Skin is warm and dry.      Capillary Refill: Capillary refill takes less than 2 seconds.   Neurological:      Mental Status: She is alert and oriented to person, place, and time.      Motor: No abnormal muscle tone.      Coordination: Coordination normal.   Psychiatric:         Behavior: Behavior normal.         Thought Content: Thought content normal.         Judgment: Judgment normal.           Assessment / Plan:     Type 2 diabetes mellitus with hyperglycemia, without long-term current use of insulin  -     POCT Glucose, Hand-Held Device  -     blood-glucose meter kit; To check BG 2 times daily, to use with insurance preferred meter  Dispense: 1 each; Refill: 0  -     lancets Misc; To check BG 2 times daily, to use with insurance preferred meter  Dispense: 200 each; Refill: 3  -     blood sugar diagnostic Strp; To check BG 3 times daily, to use with insurance preferred meter  Dispense: 200 each; Refill: 3    Hypothyroidism, unspecified type    Essential hypertension    Pure hypercholesterolemia    Hypothyroidism (acquired)    Atypical mole  -     Ambulatory referral/consult to Dermatology; Future;  Expected date: 11/21/2024    Memory loss      Additional Plan Details:    - POCT Glucose  - Encouraged continuation of lifestyle changes including regular exercise and limiting carbohydrates to 30-45 grams per meal threes times daily and 15 grams per snack with a limit of two daily.   - Encouraged continued monitoring of blood glucose with an increase to 1 times 3 times per week at Fasting. Insurance preferred Meter and supplies Rx today.    - Current DM Medication Regimen: Continue diet and exercise.   - Health Maintenance standards addressed today: Eye Exam - completed outside of [facility]; will sign ROR today for records, Lipid panel to be scheduled today, Urine Microalbumin / Creatinine Ratio scheduled, A1c to be scheduled, Flu Shot - Patient completed outside of [facility]; will coordinate records retrieval , and Mammogram - [facility]  - Referral to internal medicine - est care with PCP   - Referral to Derm - atypical mole between toes  - Nursing Visit: Patient is below goal range for nursing visit for age group and will not need nursing visit at this time .   - Follow up with me in 6 months with A1c prior.       Blakeney McKnight, PA-C [facility]    A total of 30 minutes was spent in face to face time, of which over 50 % was spent in counseling patient on disease process, complications, treatment, and side effects of medications.

## 2024-11-14 NOTE — TELEPHONE ENCOUNTER
Please book same day fasting labs today + urine micro     Samy Calderon PA-C, BC-ADM  [facility]

## 2024-11-19 LAB — GAD65 AB SER-SCNC: 0 NMOL/L

## 2024-11-20 ENCOUNTER — TELEPHONE (OUTPATIENT)
Dept: DIABETES | Facility: CLINIC | Age: 64
End: 2024-11-20
Payer: OTHER GOVERNMENT

## 2024-11-20 DIAGNOSIS — E78.00 PURE HYPERCHOLESTEROLEMIA: Primary | ICD-10-CM

## 2024-11-20 RX ORDER — ATORVASTATIN CALCIUM 40 MG/1
40 TABLET, FILM COATED ORAL DAILY
Qty: 90 TABLET | Refills: 3 | Status: SHIPPED | OUTPATIENT
Start: 2024-11-20 | End: 2025-11-20

## 2024-11-20 NOTE — TELEPHONE ENCOUNTER
Change Lipitor from 20 mg to 40 mg secondary to Lipid panel on 11/14/24.     Samy Calderon PA-C, BC-ADM  [facility]

## 2025-05-14 ENCOUNTER — TELEPHONE (OUTPATIENT)
Dept: DIABETES | Facility: CLINIC | Age: 65
End: 2025-05-14
Payer: MEDICARE

## 2025-05-14 NOTE — TELEPHONE ENCOUNTER
----- Message from Mona sent at 5/14/2025 12:07 PM CDT -----  Contact: Nuria  .Type:  Patient Request Call BackWho Called:[place] the patient know what this is regarding?:Reschedule her missed appointmentWould the patient rather a call back or a response via [facility]? Call Dee Dee Call Back Number:[phone] Information: Offered June 11th patient declined will be out of town

## 2025-05-14 NOTE — TELEPHONE ENCOUNTER
----- Message from Keren sent at 5/14/2025  1:04 PM CDT -----  Name of Who is Calling:Pt  What is the request in detail:Pt would like a call back to [facility] 5/14/25 appt. Please advise thank you   Can the clinic reply by [facility]:no  What Number to Call Back if not in [facility]:Telephone Information:Mobile          [phone]  ----- Message -----  From: Keren Pierre  Sent: 5/14/2025   1:09 PM CDT  To: [facility] Staff    Name of Who is Calling:Pt  What is the request in detail:Pt would like a call back to [facility] 5/14/25 appt. Please advise thank you   Can the clinic reply by [facility]:no  What Number to Call Back if not in [facility]:Telephone Information:Mobile          [phone]

## 2025-05-14 NOTE — TELEPHONE ENCOUNTER
Spoke with patient states that she over slept and missed her appointment pt wanted to placed on [facility] waiting list and scheduled on 07/30/25

## 2025-05-14 NOTE — TELEPHONE ENCOUNTER
Called patient to assist with rescheduling follow up. No answer. Unable to leave a message, mailbox is full.

## 2025-06-03 ENCOUNTER — OFFICE VISIT (OUTPATIENT)
Dept: OTOLARYNGOLOGY | Facility: CLINIC | Age: 65
End: 2025-06-03
Payer: MEDICARE

## 2025-06-03 VITALS — HEIGHT: 62 IN | BODY MASS INDEX: 30.76 KG/M2 | WEIGHT: 167.13 LBS

## 2025-06-03 DIAGNOSIS — H57.9 ITCHY EYES: ICD-10-CM

## 2025-06-03 DIAGNOSIS — Z91.09 ENVIRONMENTAL ALLERGIES: ICD-10-CM

## 2025-06-03 DIAGNOSIS — J30.9 ALLERGIC RHINITIS, UNSPECIFIED SEASONALITY, UNSPECIFIED TRIGGER: Primary | ICD-10-CM

## 2025-06-03 PROCEDURE — 1159F MED LIST DOCD IN RCRD: CPT | Mod: CPTII,S$GLB,, | Performed by: PHYSICIAN ASSISTANT

## 2025-06-03 PROCEDURE — 1101F PT FALLS ASSESS-DOCD LE1/YR: CPT | Mod: CPTII,S$GLB,, | Performed by: PHYSICIAN ASSISTANT

## 2025-06-03 PROCEDURE — 99999 PR PBB SHADOW E&M-EST. PATIENT-LVL IV: CPT | Mod: PBBFAC,,, | Performed by: PHYSICIAN ASSISTANT

## 2025-06-03 PROCEDURE — 3008F BODY MASS INDEX DOCD: CPT | Mod: CPTII,S$GLB,, | Performed by: PHYSICIAN ASSISTANT

## 2025-06-03 PROCEDURE — 1126F AMNT PAIN NOTED NONE PRSNT: CPT | Mod: CPTII,S$GLB,, | Performed by: PHYSICIAN ASSISTANT

## 2025-06-03 PROCEDURE — 3288F FALL RISK ASSESSMENT DOCD: CPT | Mod: CPTII,S$GLB,, | Performed by: PHYSICIAN ASSISTANT

## 2025-06-03 PROCEDURE — 99213 OFFICE O/P EST LOW 20 MIN: CPT | Mod: S$GLB,,, | Performed by: PHYSICIAN ASSISTANT

## 2025-06-03 RX ORDER — LEVOCETIRIZINE DIHYDROCHLORIDE 5 MG/1
5 TABLET, FILM COATED ORAL NIGHTLY
Qty: 30 TABLET | Refills: 11 | Status: SHIPPED | OUTPATIENT
Start: 2025-06-03 | End: 2026-06-03

## 2025-06-03 RX ORDER — AZELASTINE 1 MG/ML
1 SPRAY, METERED NASAL 2 TIMES DAILY
Qty: 30 ML | Refills: 0 | Status: SHIPPED | OUTPATIENT
Start: 2025-06-03 | End: 2026-06-03

## 2025-06-03 RX ORDER — FLUTICASONE PROPIONATE 50 MCG
2 SPRAY, SUSPENSION (ML) NASAL DAILY
Qty: 18.2 ML | Refills: 6 | Status: SHIPPED | OUTPATIENT
Start: 2025-06-03

## 2025-07-30 ENCOUNTER — CLINICAL SUPPORT (OUTPATIENT)
Dept: DIABETES | Facility: CLINIC | Age: 65
End: 2025-07-30
Payer: MEDICARE

## 2025-07-30 ENCOUNTER — LAB VISIT (OUTPATIENT)
Dept: LAB | Facility: HOSPITAL | Age: 65
End: 2025-07-30
Attending: PHYSICIAN ASSISTANT
Payer: MEDICARE

## 2025-07-30 ENCOUNTER — OFFICE VISIT (OUTPATIENT)
Dept: DIABETES | Facility: CLINIC | Age: 65
End: 2025-07-30
Payer: MEDICARE

## 2025-07-30 VITALS
BODY MASS INDEX: 30.76 KG/M2 | WEIGHT: 167.13 LBS | HEART RATE: 70 BPM | SYSTOLIC BLOOD PRESSURE: 120 MMHG | HEIGHT: 62 IN | DIASTOLIC BLOOD PRESSURE: 76 MMHG | OXYGEN SATURATION: 94 %

## 2025-07-30 VITALS — WEIGHT: 167.13 LBS | BODY MASS INDEX: 30.76 KG/M2 | HEIGHT: 62 IN

## 2025-07-30 DIAGNOSIS — G47.33 OSA (OBSTRUCTIVE SLEEP APNEA): ICD-10-CM

## 2025-07-30 DIAGNOSIS — E11.65 TYPE 2 DIABETES MELLITUS WITH HYPERGLYCEMIA, WITHOUT LONG-TERM CURRENT USE OF INSULIN: Primary | ICD-10-CM

## 2025-07-30 DIAGNOSIS — E78.00 PURE HYPERCHOLESTEROLEMIA: ICD-10-CM

## 2025-07-30 DIAGNOSIS — E11.65 TYPE 2 DIABETES MELLITUS WITH HYPERGLYCEMIA, WITHOUT LONG-TERM CURRENT USE OF INSULIN: ICD-10-CM

## 2025-07-30 DIAGNOSIS — E03.9 HYPOTHYROIDISM (ACQUIRED): ICD-10-CM

## 2025-07-30 DIAGNOSIS — I10 ESSENTIAL HYPERTENSION: ICD-10-CM

## 2025-07-30 DIAGNOSIS — G47.9 SLEEP DISTURBANCE: ICD-10-CM

## 2025-07-30 LAB
ABSOLUTE EOSINOPHIL (OHS): 0.32 K/UL
ABSOLUTE MONOCYTE (OHS): 0.47 K/UL (ref 0.3–1)
ABSOLUTE NEUTROPHIL COUNT (OHS): 2.42 K/UL (ref 1.8–7.7)
ALBUMIN SERPL BCP-MCNC: 4.3 G/DL (ref 3.5–5.2)
ALBUMIN/CREAT UR: NORMAL
ALP SERPL-CCNC: 80 UNIT/L (ref 40–150)
ALT SERPL W/O P-5'-P-CCNC: 17 UNIT/L (ref 0–55)
ANION GAP (OHS): 12 MMOL/L (ref 8–16)
AST SERPL-CCNC: 28 UNIT/L (ref 0–50)
BASOPHILS # BLD AUTO: 0.09 K/UL
BASOPHILS NFR BLD AUTO: 1.6 %
BILIRUB SERPL-MCNC: 1 MG/DL (ref 0.1–1)
BUN SERPL-MCNC: 14 MG/DL (ref 8–23)
CALCIUM SERPL-MCNC: 10.1 MG/DL (ref 8.7–10.5)
CHLORIDE SERPL-SCNC: 110 MMOL/L (ref 95–110)
CHOLEST SERPL-MCNC: 191 MG/DL (ref 120–199)
CHOLEST/HDLC SERPL: 4.1 {RATIO} (ref 2–5)
CO2 SERPL-SCNC: 24 MMOL/L (ref 23–29)
CREAT SERPL-MCNC: 1 MG/DL (ref 0.5–1.4)
CREAT UR-MCNC: 56 MG/DL (ref 15–325)
EAG (OHS): 137 MG/DL (ref 68–131)
ERYTHROCYTE [DISTWIDTH] IN BLOOD BY AUTOMATED COUNT: 13.7 % (ref 11.5–14.5)
GFR SERPLBLD CREATININE-BSD FMLA CKD-EPI: >60 ML/MIN/1.73/M2
GLUCOSE SERPL-MCNC: 101 MG/DL (ref 70–110)
GLUCOSE SERPL-MCNC: 114 MG/DL (ref 70–110)
HBA1C MFR BLD: 6.4 % (ref 4–5.6)
HCT VFR BLD AUTO: 44.9 % (ref 37–48.5)
HCV AB SERPL QL IA: NORMAL
HDLC SERPL-MCNC: 47 MG/DL (ref 40–75)
HDLC SERPL: 24.6 % (ref 20–50)
HGB BLD-MCNC: 14.3 GM/DL (ref 12–16)
IMM GRANULOCYTES # BLD AUTO: 0.01 K/UL (ref 0–0.04)
IMM GRANULOCYTES NFR BLD AUTO: 0.2 % (ref 0–0.5)
LDLC SERPL CALC-MCNC: 114.4 MG/DL (ref 63–159)
LYMPHOCYTES # BLD AUTO: 2.22 K/UL (ref 1–4.8)
MCH RBC QN AUTO: 26.8 PG (ref 27–31)
MCHC RBC AUTO-ENTMCNC: 31.8 G/DL (ref 32–36)
MCV RBC AUTO: 84 FL (ref 82–98)
MICROALBUMIN UR-MCNC: <5 UG/ML (ref ?–5000)
NONHDLC SERPL-MCNC: 144 MG/DL
NUCLEATED RBC (/100WBC) (OHS): 0 /100 WBC
PLATELET # BLD AUTO: 354 K/UL (ref 150–450)
PMV BLD AUTO: 10.6 FL (ref 9.2–12.9)
POTASSIUM SERPL-SCNC: 4.2 MMOL/L (ref 3.5–5.1)
PROT SERPL-MCNC: 7.7 GM/DL (ref 6–8.4)
RBC # BLD AUTO: 5.33 M/UL (ref 4–5.4)
RELATIVE EOSINOPHIL (OHS): 5.8 %
RELATIVE LYMPHOCYTE (OHS): 40.1 % (ref 18–48)
RELATIVE MONOCYTE (OHS): 8.5 % (ref 4–15)
RELATIVE NEUTROPHIL (OHS): 43.8 % (ref 38–73)
SODIUM SERPL-SCNC: 146 MMOL/L (ref 136–145)
T4 FREE SERPL-MCNC: 0.98 NG/DL (ref 0.71–1.51)
TRIGL SERPL-MCNC: 148 MG/DL (ref 30–150)
TSH SERPL-ACNC: 0.79 UIU/ML (ref 0.4–4)
WBC # BLD AUTO: 5.53 K/UL (ref 3.9–12.7)

## 2025-07-30 PROCEDURE — 3074F SYST BP LT 130 MM HG: CPT | Mod: CPTII,S$GLB,, | Performed by: PHYSICIAN ASSISTANT

## 2025-07-30 PROCEDURE — G0108 DIAB MANAGE TRN  PER INDIV: HCPCS | Mod: S$GLB,,, | Performed by: DIETITIAN, REGISTERED

## 2025-07-30 PROCEDURE — 99999 PR PBB SHADOW E&M-EST. PATIENT-LVL IV: CPT | Mod: PBBFAC,,, | Performed by: PHYSICIAN ASSISTANT

## 2025-07-30 PROCEDURE — 3008F BODY MASS INDEX DOCD: CPT | Mod: CPTII,S$GLB,, | Performed by: PHYSICIAN ASSISTANT

## 2025-07-30 PROCEDURE — 99214 OFFICE O/P EST MOD 30 MIN: CPT | Mod: S$GLB,,, | Performed by: PHYSICIAN ASSISTANT

## 2025-07-30 PROCEDURE — 80061 LIPID PANEL: CPT

## 2025-07-30 PROCEDURE — 1159F MED LIST DOCD IN RCRD: CPT | Mod: CPTII,S$GLB,, | Performed by: PHYSICIAN ASSISTANT

## 2025-07-30 PROCEDURE — 84439 ASSAY OF FREE THYROXINE: CPT

## 2025-07-30 PROCEDURE — 85025 COMPLETE CBC W/AUTO DIFF WBC: CPT

## 2025-07-30 PROCEDURE — 82570 ASSAY OF URINE CREATININE: CPT

## 2025-07-30 PROCEDURE — 82962 GLUCOSE BLOOD TEST: CPT | Mod: S$GLB,,, | Performed by: PHYSICIAN ASSISTANT

## 2025-07-30 PROCEDURE — 36415 COLL VENOUS BLD VENIPUNCTURE: CPT

## 2025-07-30 PROCEDURE — 86803 HEPATITIS C AB TEST: CPT

## 2025-07-30 PROCEDURE — 1101F PT FALLS ASSESS-DOCD LE1/YR: CPT | Mod: CPTII,S$GLB,, | Performed by: PHYSICIAN ASSISTANT

## 2025-07-30 PROCEDURE — 99999 PR PBB SHADOW E&M-EST. PATIENT-LVL III: CPT | Mod: PBBFAC,,, | Performed by: DIETITIAN, REGISTERED

## 2025-07-30 PROCEDURE — 82040 ASSAY OF SERUM ALBUMIN: CPT

## 2025-07-30 PROCEDURE — G2211 COMPLEX E/M VISIT ADD ON: HCPCS | Mod: S$GLB,,, | Performed by: PHYSICIAN ASSISTANT

## 2025-07-30 PROCEDURE — 84443 ASSAY THYROID STIM HORMONE: CPT

## 2025-07-30 PROCEDURE — 3078F DIAST BP <80 MM HG: CPT | Mod: CPTII,S$GLB,, | Performed by: PHYSICIAN ASSISTANT

## 2025-07-30 PROCEDURE — 83036 HEMOGLOBIN GLYCOSYLATED A1C: CPT

## 2025-07-30 PROCEDURE — 3288F FALL RISK ASSESSMENT DOCD: CPT | Mod: CPTII,S$GLB,, | Performed by: PHYSICIAN ASSISTANT

## 2025-07-30 PROCEDURE — 1126F AMNT PAIN NOTED NONE PRSNT: CPT | Mod: CPTII,S$GLB,, | Performed by: PHYSICIAN ASSISTANT

## 2025-07-30 RX ORDER — ALBUTEROL SULFATE 90 UG/1
INHALANT RESPIRATORY (INHALATION)
COMMUNITY

## 2025-07-30 RX ORDER — CETIRIZINE HYDROCHLORIDE 10 MG/1
10 TABLET ORAL DAILY
COMMUNITY

## 2025-07-30 RX ORDER — AMITRIPTYLINE HYDROCHLORIDE 25 MG/1
25 TABLET, FILM COATED ORAL NIGHTLY
COMMUNITY

## 2025-07-30 NOTE — PROGRESS NOTES
"Diabetes Care Specialist Progress Note  Author: Marleen Hall RD, [facility]  Date: 7/30/2025    Intake    Program Intake  Reason for Diabetes Program Visit:: Initial Diabetes Assessment (DM referral 7/30/25 Samy Calderon, PAMario AlbertoC)  Type of Intervention:: Individual  Education: Self-Management Skill Review  Current diabetes risk level:: low    Current Diabetes Treatment: Diet/Exercise    Continuous Glucose Monitoring  Patient has CGM: No    Lab Results   Component Value Date    HGBA1C 6.2 (H) 11/14/2024       Weight: 75.8 kg (167 lb 1.7 oz)   Height: 5' 2" (157.5 cm)   Body mass index is 30.56 kg/m².    Lifestyle Coping Support & Clinical    Problem Review  Active Comorbidities:  (HTN, HLD, Hypothyroidism, GERD, BMI 30.56)    Diabetes Self-Management Skills Assessment    Medication Skills Assessment  Area of need?: Deferred (Pt isn't prescribed medications for diabetes.)    Nutrition/Healthy Eating  Meal Plan 24 Hour Recall - Breakfast: Grits, egg, fuentes - coffee, flv creamer, stevia  Meal Plan 24 Hour Recall - Lunch: watermelon, cantelope (3+cups)  Meal Plan 24 Hour Recall - Dinner: salmon, creamed potatoes, broccoli  Meal Plan 24 Hour Recall - Snack: fruit OR sm bag chips OR nuts OR belvia  Meal Plan 24 Hour Recall - Beverage: water, pepsi zero, reg tea (occs at restaurant)  Who shops/cooks?: self/  Patient can identify foods that impact blood sugar.:  (Pt requesting meal plan review.)  Nutrition/Healthy Eating Skills Assessment Completed:: Yes  Assessment indicates:: Instruction Needed, Knowledge deficit  Area of need?: Yes    Physical Activity/Exercise  Patient's daily activity level:: sedentary ([employer] 60min 2-3d/wk (aerobic).)  Patient can identify forms of physical activity.: yes  Physical Activity/Exercise Skills Assessment Completed: : Yes  Assessment indicates:: Adequate understanding  Area of need?: No    Home Blood Glucose Monitoring  Patient states that blood sugar is checked at " home daily.: no (Pt defers BG testing.)  Home Blood Glucose Monitoring Skills Assessment Completed: : Yes  Assessment indicates:: Adequate understanding  Area of need?: Yes    Acute Complications  Acute Complications Skills Assessment Completed: : No  Deffered due to:: Time  Area of need?: No    Chronic Complications  Chronic Complications Skills Assessment Completed: : No  Deferred due to:: Time  Area of need?: Deferred    Assessment Summary and Plan  Based on today's diabetes care assessment, the following areas of need were identified:      Identified Areas of Need      Medication/Current Diabetes Treatment: Deferred (Pt isn't prescribed medications for diabetes.)   Nutrition/Healthy Eating: Yes -see care plan   Physical Activity/Exercise: No     Home Blood Glucose Monitoring: Yes -see care plan   Acute Complications: No    Chronic Complications: Deferred     Today's interventions were provided through individual discussion, instruction, and written materials were provided.    Patient verbalized understanding of instruction and written materials.  Pt was able to return back demonstration of instructions today. Patient understood key points, needs reinforcement and further instruction.     Diabetes Self-Management Care Plan:  Today's Diabetes Self-Management Care Plan was developed with Nuria's input. Nuria has agreed to work toward the following goal(s) to improve his/her overall diabetes control.      Care Plan: Diabetes Management   Updates made since 7/30/2024 12:00 AM        Problem: Healthy Eating         Goal: Eat 3 meals daily - manage carb 45grams/meal, 5-15grams/snack    Start Date: 7/30/2025   Expected End Date: 7/30/2026   Priority: High   Barriers: No Barriers Identified   Note:    Reviewed role meal spacing, carb/pro balance. Pt agrees to reduce fruit portions and add protein source (boiled egg, nuts or pro shake).       Task: Reviewed the sources and role of Carbohydrate, Protein, and Fat and  how each nutrient impacts blood sugar. Completed 7/30/2025        Task: Provided visual examples using dry measuring cups, food models, and other familiar objects such as computer mouse, deck or cards, tennis ball etc. to help with visualization of portions. Completed 7/30/2025        Task: Review the importance of balancing carbohydrates with each meal using portion control techniques to count servings of carbohydrate and label reading to identify serving size and amount of total carbs per serving. Completed 7/30/2025        Task: Provided Sample plate method and reviewed the use of the plate to estimate amounts of carbohydrate per meal. Completed 7/30/2025        Follow Up Plan  Follow up in about 3 weeks (around 8/20/2025).    Today's care plan and follow up schedule was discussed with patient.  Nuria verbalized understanding of the care plan, goals, and agrees to follow up plan.        The patient was encouraged to communicate with his/her health care provider/physician and care team regarding his/her condition(s) and treatment.  I provided the patient with my contact information today and encouraged to contact me via phone or [facility] as needed.     Length of Visit   Total Time: 30 Minutes

## 2025-07-30 NOTE — PROGRESS NOTES
PCP: Sandi, Primary Doctor    Subjective:     Chief Complaint: Diabetes     HISTORY OF PRESENT ILLNESS: 65 y.o.   female presenting for diabetes visit.   The patient's last visit with me was on 11/14/2024.   Patient has had Type II diabetes since 2021.  Pertinent to decision making is the following comorbidities: HTN and HLD. Fam hx of pancreatic cancer - mother.   Patient has the following Diabetes complications: without complications  She  is to be enrolled in diabetes education classes.     Patient's most recent A1c of 6.2% was completed almost 8 months ago.   Patient states since Her last A1c Her blood glucose levels have been unknown since not checking regularly.   Patient monitors blood glucose 0 times per day : Patient admits not checking. Patient admits she does not like checking fingers.    Patient blood glucose monitoring device will not be uploaded into [employer] today.  Patient's blood glucose in clinic was 114 which was following breakfast bar.   Patient endorses the following diabetes related symptoms: None.  Patient is due today for the following diabetes-related health maintenance standards: Mammogram, DEXA scan, pneumonia vaccine, Hep C screening.   She denies recent hospital admissions or emergency room visits.   She denies having hypoglycemia.   Patient's concerns today include glycemic control. Of note, patient states she has been slacking on exercise lately due to heat- does exercise classes and walking. Would like to transition to PCP at [facility].  Following diet.   Recently saw neurology in [facility] - Dr. Rodriguez - for memory loss. Felt related to possible depression symptoms and sleep changes - pt denies depression sx. Was rx elavil but did nto start yet. Referred to outside pulm but did not schedule- was told in the past she had DEEPTHI but does not wear CPAP.   Patient medication regimen is as below.     CURRENT DM MEDICATIONS:   Diet and exercise    Patient has failed the  "following Diabetes medications:   N/A       Labs Reviewed.       Lab Results   Component Value Date    CPEPTIDE 2.42 11/14/2024     Lab Results   Component Value Date    GLUTAMICACID 0.00 11/14/2024       Height: 5' 2" (157.5 cm)  //  Weight: 75.8 kg (167 lb 1.7 oz), Body mass index is 30.56 kg/m².  Her blood sugar in clinic today is:    No components found for: "POCGLUC"      Review of Systems   Constitutional:  Negative for activity change, appetite change, chills and fever.   HENT:  Negative for dental problem, mouth sores, nosebleeds, sore throat and trouble swallowing.    Eyes:  Negative for pain and discharge.   Respiratory:  Negative for shortness of breath, wheezing and stridor.    Cardiovascular:  Negative for chest pain, palpitations and leg swelling.   Gastrointestinal:  Negative for abdominal pain, diarrhea, nausea and vomiting.   Endocrine: Negative for polydipsia, polyphagia and polyuria.   Genitourinary:  Negative for dysuria, frequency and urgency.   Musculoskeletal:  Negative for joint swelling and myalgias.   Skin:  Negative for rash and wound.   Neurological:  Negative for dizziness, syncope, weakness and headaches.   Psychiatric/Behavioral:  Negative for behavioral problems and dysphoric mood.          Diabetes Management Status  Statin: Taking  ACE/ARB: Not taking    Screening or Prevention Patient's value Goal Complete/Controlled?   HgA1C Testing and Control   Lab Results   Component Value Date    HGBA1C 6.2 (H) 11/14/2024      Annually/Less than 8% No     Lipid profile : 11/14/2024 Annually No     LDL control Lab Results   Component Value Date    LDLCALC 128.2 11/14/2024    Annually/Less than 100 mg/dl  No     Nephropathy screening Lab Results   Component Value Date    MICALBCREAT Unable to calculate 11/14/2024     No results found for: "PROTEINUA" Annually No     Blood pressure BP Readings from Last 1 Encounters:   07/30/25 120/76    Less than 140/90 Yes     Dilated retinal exam Most Recent " Eye Exam Date: Not Found Annually Yes    Foot exam   Most Recent Foot Exam Date: Not Found Annually Yes       Social History     Socioeconomic History    Marital status:    Tobacco Use    Smoking status: Never    Smokeless tobacco: Never   Substance and Sexual Activity    Alcohol use: Not Currently    Drug use: Not Currently     Past Medical History:   Diagnosis Date    Hypertension        Objective:      Physical Exam  Constitutional:       General: She is not in acute distress.     Appearance: She is well-developed. She is not diaphoretic.   HENT:      Head: Normocephalic and atraumatic.      Right Ear: External ear normal.      Left Ear: External ear normal.      Nose: Nose normal.   Eyes:      General:         Right eye: No discharge.         Left eye: No discharge.      Pupils: Pupils are equal, round, and reactive to light.   Cardiovascular:      Rate and Rhythm: Normal rate and regular rhythm.      Heart sounds: Normal heart sounds.   Pulmonary:      Effort: Pulmonary effort is normal.      Breath sounds: Normal breath sounds.   Abdominal:      Palpations: Abdomen is soft.   Musculoskeletal:         General: Normal range of motion.      Cervical back: Normal range of motion and neck supple.   Skin:     General: Skin is warm and dry.      Capillary Refill: Capillary refill takes less than 2 seconds.   Neurological:      Mental Status: She is alert and oriented to person, place, and time.      Motor: No abnormal muscle tone.      Coordination: Coordination normal.   Psychiatric:         Behavior: Behavior normal.         Thought Content: Thought content normal.         Judgment: Judgment normal.           Assessment / Plan:     Type 2 diabetes mellitus with hyperglycemia, without long-term current use of insulin  -     POCT Glucose, Hand-Held Device  -     Hemoglobin A1C; Future; Expected date: 07/30/2025  -     Lipid Panel; Future; Expected date: 07/30/2025  -     Hemoglobin A1C; Future; Expected date:  07/30/2025  -     Lipid Panel; Future; Expected date: 07/30/2025  -     TSH; Future; Expected date: 07/30/2025  -     Comprehensive Metabolic Panel; Future; Expected date: 07/30/2025  -     CBC Auto Differential; Future; Expected date: 07/30/2025  -     T4, Free; Future; Expected date: 07/30/2025  -     Microalbumin/Creatinine Ratio, Urine; Future; Expected date: 07/30/2025  -     Hepatitis C Antibody; Future; Expected date: 07/30/2025    Essential hypertension    Pure hypercholesterolemia    Hypothyroidism (acquired)    Sleep disturbance  -     Ambulatory referral/consult to Sleep Disorders; Future; Expected date: 08/06/2025    DEEPTHI (obstructive sleep apnea)  -     Ambulatory referral/consult to Sleep Disorders; Future; Expected date: 08/06/2025      Additional Plan Details:    - POCT Glucose  - Encouraged continuation of lifestyle changes including regular exercise and limiting carbohydrates to 30-45 grams per meal threes times daily and 15 grams per snack with a limit of two daily.   - Encouraged continued monitoring of blood glucose with an increase to 1 times 3 times per week at Fasting.  - Current DM Medication Regimen: Continue diet and exercise.   - Health Maintenance standards addressed today: Mammogram - [facility], DEXA scan, Pneumonia vaccine  - Referral to internal medicine - est care with PCP  - Referral to Sleep Clinic - DEEPTHI, Sleep disturbance   - Referral to CDE - nutrition focus  - Nursing Visit: Patient is below goal range for nursing visit for age group and will not need nursing visit at this time .   - Follow up with me in 1 yrs with A1c prior as long as labs stable.        Blakeney McKnight, PA-C [facility]    A total of 30 minutes was spent in face to face time, of which over 50 % was spent in counseling patient on disease process, complications, treatment, and side effects of medications.

## 2025-08-12 ENCOUNTER — TELEPHONE (OUTPATIENT)
Dept: DIABETES | Facility: CLINIC | Age: 65
End: 2025-08-12
Payer: MEDICARE

## 2025-08-12 DIAGNOSIS — E78.00 PURE HYPERCHOLESTEROLEMIA: ICD-10-CM

## 2025-08-12 RX ORDER — ATORVASTATIN CALCIUM 40 MG/1
40 TABLET, FILM COATED ORAL DAILY
Qty: 90 TABLET | Refills: 3 | Status: SHIPPED | OUTPATIENT
Start: 2025-08-12 | End: 2026-08-12

## 2025-08-19 ENCOUNTER — TELEPHONE (OUTPATIENT)
Dept: ALLERGY | Facility: CLINIC | Age: 65
End: 2025-08-19
Payer: MEDICARE

## 2025-08-19 ENCOUNTER — TELEPHONE (OUTPATIENT)
Dept: DIABETES | Facility: CLINIC | Age: 65
End: 2025-08-19
Payer: MEDICARE